# Patient Record
Sex: FEMALE | Race: WHITE
[De-identification: names, ages, dates, MRNs, and addresses within clinical notes are randomized per-mention and may not be internally consistent; named-entity substitution may affect disease eponyms.]

---

## 2019-03-01 ENCOUNTER — HOSPITAL ENCOUNTER (EMERGENCY)
Dept: HOSPITAL 80 - FED | Age: 59
Discharge: HOME | End: 2019-03-01
Payer: COMMERCIAL

## 2019-03-01 VITALS — SYSTOLIC BLOOD PRESSURE: 128 MMHG | DIASTOLIC BLOOD PRESSURE: 77 MMHG

## 2019-03-01 DIAGNOSIS — R10.31: Primary | ICD-10-CM

## 2019-03-01 LAB — PLATELET # BLD: 250 10^3/UL (ref 150–400)

## 2019-03-01 NOTE — EDPHY
H & P


Stated Complaint: rlq abd pain x 2.5 wks started after bout of coughing


Time Seen by Provider: 03/01/19 13:08


HPI/ROS: 





CHIEF COMPLAINT:  Right lower quadrant pain





HISTORY OF PRESENT ILLNESS:  The patient presents to the ED with 3 weeks of 

worsening right lower quadrant pain.  The patient's symptoms began after an 

episode of coughing.  The patient denies any vomiting or diarrhea.  The patient 

has a prior history of appendectomy.  The patient did have some improvement of 

her symptoms after seeing a chiropractor however had recurrent symptoms.  The 

patient does report a cousin who had ovarian cancer as well as sister had a 

history of a teratoma.  She was concerned about the possibility of underlying 

pathology prompting her visit to the emergency department today.





REVIEW OF SYSTEMS:


A comprehensive 10 point review of systems is otherwise negative aside from 

elements mentioned in the history of present illness.








Source: Patient


Exam Limitations: No limitations





- Personal History


Current Tetanus Diphtheria and Acellular Pertussis (TDAP): Unsure





- Medical/Surgical History


Hx Asthma: Yes


Hx Chronic Respiratory Disease: No


Hx Diabetes: No


Hx Cardiac Disease: No


Hx Renal Disease: No


Hx Cirrhosis: No


Hx Alcoholism: No


Hx HIV/AIDS: No


Hx Splenectomy or Spleen Trauma: No


Other PMH: svt ablation /l knee replacement colon polyps appy with c sect





- Social History


Smoking Status: Never smoked





- Physical Exam


Exam: 





General Appearance:  Alert, no distress


Eyes:  Pupils equal and round no pallor or injection


ENT, Mouth:  Mucous membranes moist


Respiratory:  There are no retractions, lungs are clear to auscultation


Cardiovascular:  Regular rate and rhythm


Gastrointestinal:  Tenderness to palpation right lower quadrant, no peritoneal 

signs


Neurological:  5/5 strength noted all 4 extremities


Skin:  Warm and dry, no rashes


Musculoskeletal:  Neck is supple nontender


Extremities:  symmetrical, full range of motion


Psychiatric:  Patient is oriented X 3, there is no agitation


Constitutional: 


 Initial Vital Signs











Temperature (C)  36.5 C   03/01/19 13:00


 


Heart Rate  88   03/01/19 13:00


 


Respiratory Rate  18   03/01/19 13:00


 


Blood Pressure  127/85 H  03/01/19 13:00


 


O2 Sat (%)  96   03/01/19 13:00








 











O2 Delivery Mode               Room Air














Allergies/Adverse Reactions: 


 





No Known Allergies Allergy (Unverified 03/01/19 13:00)


 








Home Medications: 














 Medication  Instructions  Recorded


 


Valsartan  03/01/19














Medical Decision Making





- Diagnostics


Imaging Results: 


 Imaging Impressions





Abdomen CT  03/01/19 13:43


Impression:


1. Normal appendix. No source for right lower quadrant pain identified.


2. Right coronary artery disease.


 


Results discussed with Dr. Earl Russell at 2:11 PM.


 


General information for patients regarding this examination can be found at 

[email or web address]


 


If you have questions or comments about this report, please contact me at [phone] (hospital) or [phone] (cell). 


 











ED Course/Re-evaluation: 





Patient presents to the ED with several weeks of right lower quadrant pain.  

The patient's abdominal exam is reassuring.  She has tenderness only to deep 

palpation in the right lower quadrant.  The patient did believe that she had 

had a prior appendectomy.





CT scan of the abdomen pelvis was obtained which demonstrates a normal 

appendix.  There is no explanation for right lower quadrant tenderness.





I informed the patient of the results of her CT scan.  I have asked her to 

follow up with both primary care and gynecology for further evaluation of her 

symptoms.





She should return to the ED for markedly worsening symptoms or other concerns.








Differential Diagnosis: 





Differential diagnosis considered includes intestinal obstruction, perforation, 

appendicitis, ovarian torsion, ovarian cyst





- Data Points


Laboratory Results: 


 Laboratory Results





 03/01/19 13:29 





 03/01/19 13:13 





 











  03/01/19 03/01/19 03/01/19





  13:29 13:29 13:13


 


WBC    6.08 10^3/uL 10^3/uL  





    (3.80-9.50)  


 


RBC    4.59 10^6/uL 10^6/uL  





    (4.18-5.33)  


 


Hgb    14.6 g/dL g/dL  





    (12.6-16.3)  


 


POC Hgb  15.3 gm/dL gm/dL    





   (12.6-16.3)   


 


Hct    42.8 % %  





    (38.0-47.0)  


 


POC Hct  45 % %    





   (38-47)   


 


MCV    93.2 fL fL  





    (81.5-99.8)  


 


MCH    31.8 pg pg  





    (27.9-34.1)  


 


MCHC    34.1 g/dL g/dL  





    (32.4-36.7)  


 


RDW    11.9 % %  





    (11.5-15.2)  


 


Plt Count    250 10^3/uL 10^3/uL  





    (150-400)  


 


MPV    9.7 fL fL  





    (8.7-11.7)  


 


Neut % (Auto)    62.2 % %  





    (39.3-74.2)  


 


Lymph % (Auto)    28.5 % %  





    (15.0-45.0)  


 


Mono % (Auto)    7.7 % %  





    (4.5-13.0)  


 


Eos % (Auto)    0.8 % %  





    (0.6-7.6)  


 


Baso % (Auto)    0.5 % %  





    (0.3-1.7)  


 


Nucleat RBC Rel Count    0.0 % %  





    (0.0-0.2)  


 


Absolute Neuts (auto)    3.78 10^3/uL 10^3/uL  





    (1.70-6.50)  


 


Absolute Lymphs (auto)    1.73 10^3/uL 10^3/uL  





    (1.00-3.00)  


 


Absolute Monos (auto)    0.47 10^3/uL 10^3/uL  





    (0.30-0.80)  


 


Absolute Eos (auto)    0.05 10^3/uL 10^3/uL  





    (0.03-0.40)  


 


Absolute Basos (auto)    0.03 10^3/uL 10^3/uL  





    (0.02-0.10)  


 


Absolute Nucleated RBC    0.00 10^3/uL 10^3/uL  





    (0-0.01)  


 


Immature Gran %    0.3 % %  





    (0.0-1.1)  


 


Immature Gran #    0.02 10^3/uL 10^3/uL  





    (0.00-0.10)  


 


POC Sodium  142 mEq/L mEq/L    





   (135-145)   


 


Sodium      137 mEq/L mEq/L





     (135-145) 


 


POC Potassium  3.6 mEq/L mEq/L    





   (3.3-5.0)   


 


Potassium      3.9 mEq/L mEq/L





     (3.5-5.2) 


 


POC Chloride  107 mEq/L mEq/L    





   ()   


 


Chloride      108 mEq/L mEq/L





     () 


 


Carbon Dioxide      22 mEq/l mEq/l





     (22-31) 


 


POC Total CO2  23 mEq/L mEq/L    





   (22-31)   


 


Anion Gap      7 mEq/L mEq/L





     (6-14) 


 


POC BUN  26 mg/dL H mg/dL    





   (7-23)   


 


BUN      27 mg/dL H mg/dL





     (7-23) 


 


Creatinine      0.7 mg/dL mg/dL





     (0.6-1.0) 


 


POC Creatinine  0.7 mg/dL mg/dL    





   (0.6-1.0)   


 


Estimated GFR      > 60 





    


 


Glucose      93 mg/dL mg/dL





     () 


 


POC Glucose  93 mg/dL mg/dL    





   ()   


 


Calcium      9.6 mg/dL mg/dL





     (8.5-10.4) 


 


Total Bilirubin      1.0 mg/dL mg/dL





     (0.1-1.4) 


 


Conjugated Bilirubin      0.3 mg/dL mg/dL





     (0.0-0.5) 


 


Unconjugated Bilirubin      0.7 mg/dL mg/dL





     (0.0-1.1) 


 


AST      29 IU/L IU/L





     (14-46) 


 


ALT      34 IU/L IU/L





     (9-52) 


 


Alkaline Phosphatase      68 IU/L IU/L





     () 


 


Total Protein      7.4 g/dL g/dL





     (6.3-8.2) 


 


Albumin      4.5 g/dL g/dL





     (3.5-5.0) 


 


Lipase      266 IU/L IU/L





     () 











Point of Care Test Results: 


 Chemistry











  03/01/19





  13:29


 


POC Sodium  142 mEq/L mEq/L





   (135-145) 


 


POC Potassium  3.6 mEq/L mEq/L





   (3.3-5.0) 


 


POC Chloride  107 mEq/L mEq/L





   () 


 


POC Total CO2  23 mEq/L mEq/L





   (22-31) 


 


POC BUN  26 mg/dL H mg/dL





   (7-23) 


 


POC Creatinine  0.7 mg/dL mg/dL





   (0.6-1.0) 


 


POC Glucose  93 mg/dL mg/dL





   () 








 ISTAT H&H











  03/01/19





  13:29


 


POC Hgb  15.3 gm/dL gm/dL





   (12.6-16.3) 


 


POC Hct  45 % %





   (38-47) 














Departure





- Departure


Disposition: Home, Routine, Self-Care


Clinical Impression: 


 Abdominal pain





Condition: Good


Instructions:  Acute Abdominal Pain (ED)


Additional Instructions: 


1. I do recommend following up with Gastroenterology and gynecology for further 

evaluation of your lower abdominal pain.  The imaging in the emergency 

department today demonstrates no obvious explanation for your symptoms.  

Incidentally your noted to have a normal appearing appendix.


2. You have been given the number of our on-call gastroenterologist and 

gynecologist.


3. Return to the ED for markedly worsening symptoms or other concerns.





  


Referrals: 


Megan Hong MD [Medical Doctor] - As per Instructions


Gregory Purdy MD, FACG [Medical Doctor] - As per Instructions